# Patient Record
(demographics unavailable — no encounter records)

---

## 2025-07-16 NOTE — END OF VISIT
[FreeTextEntry3] : I, Margaretdesiree Lockhartyolanda, acted as a scribe on behalf of Dr. Mckeon on 07/16/2025 .  All medical entries made by the scribe were at my, Dr. Mckeon, direction and personally dictated by me on 07/16/2025 . I have reviewed the chart and agree that the record accurately reflects my personal performance of the history, physical exam, assessment and plan. I have also personally directed, reviewed, and agreed with the chart.

## 2025-07-16 NOTE — PLAN
[FreeTextEntry1] :  37 year old female presents for annual exam.  -HPV/Pap performed today -Refill Slynd -Start again 8wks post-op  -RTO 1 year for annual Jessica Mckeon MD

## 2025-07-16 NOTE — HISTORY OF PRESENT ILLNESS
[FreeTextEntry1] :  37 year old female presents for annual exam. Pt recently had surgery for a torn labrum repair. Pt reports period lasted longer after surgery then came back a couple weeks later and only lasted 5 days. Has not another one since. Doing well, no complaints. stopped sylnd in this sue operative period but planning on restarting  PSHx: Torn Labrum Repair